# Patient Record
Sex: FEMALE | Race: ASIAN | NOT HISPANIC OR LATINO
[De-identification: names, ages, dates, MRNs, and addresses within clinical notes are randomized per-mention and may not be internally consistent; named-entity substitution may affect disease eponyms.]

---

## 2022-02-08 PROBLEM — Z00.00 ENCOUNTER FOR PREVENTIVE HEALTH EXAMINATION: Status: ACTIVE | Noted: 2022-02-08

## 2022-02-09 PROBLEM — R91.1 LUNG NODULE: Status: ACTIVE | Noted: 2022-02-09

## 2022-02-10 ENCOUNTER — APPOINTMENT (OUTPATIENT)
Dept: THORACIC SURGERY | Facility: CLINIC | Age: 72
End: 2022-02-10
Payer: MEDICARE

## 2022-02-10 VITALS
WEIGHT: 116 LBS | HEIGHT: 64 IN | SYSTOLIC BLOOD PRESSURE: 149 MMHG | HEART RATE: 76 BPM | OXYGEN SATURATION: 98 % | TEMPERATURE: 98 F | BODY MASS INDEX: 19.81 KG/M2 | DIASTOLIC BLOOD PRESSURE: 76 MMHG | RESPIRATION RATE: 18 BRPM

## 2022-02-10 DIAGNOSIS — R91.1 SOLITARY PULMONARY NODULE: ICD-10-CM

## 2022-02-10 DIAGNOSIS — Z86.79 PERSONAL HISTORY OF OTHER DISEASES OF THE CIRCULATORY SYSTEM: ICD-10-CM

## 2022-02-10 PROCEDURE — 99205 OFFICE O/P NEW HI 60 MIN: CPT

## 2022-02-11 PROBLEM — Z86.79 HISTORY OF HYPERTENSION: Status: RESOLVED | Noted: 2022-02-11 | Resolved: 2022-02-11

## 2022-02-11 RX ORDER — AMLODIPINE BESYLATE 5 MG/1
TABLET ORAL
Refills: 0 | Status: ACTIVE | COMMUNITY

## 2022-02-11 RX ORDER — PNV NO.95/FERROUS FUM/FOLIC AC 28MG-0.8MG
TABLET ORAL
Refills: 0 | Status: ACTIVE | COMMUNITY

## 2022-02-11 NOTE — PHYSICAL EXAM
[Restricted in physically strenuous activity but ambulatory and able to carry out work of a light or sedentary nature] : Status 1- Restricted in physically strenuous activity but ambulatory and able to carry out work of a light or sedentary nature, e.g., light house work, office work [General Appearance - Alert] : alert [General Appearance - In No Acute Distress] : in no acute distress [Sclera] : the sclera and conjunctiva were normal [PERRL With Normal Accommodation] : pupils were equal in size, round, and reactive to light [Extraocular Movements] : extraocular movements were intact [Outer Ear] : the ears and nose were normal in appearance [Oropharynx] : the oropharynx was normal [Neck Appearance] : the appearance of the neck was normal [Neck Cervical Mass (___cm)] : no neck mass was observed [Jugular Venous Distention Increased] : there was no jugular-venous distention [Thyroid Diffuse Enlargement] : the thyroid was not enlarged [Thyroid Nodule] : there were no palpable thyroid nodules [Auscultation Breath Sounds / Voice Sounds] : lungs were clear to auscultation bilaterally [Heart Rate And Rhythm] : heart rate was normal and rhythm regular [Heart Sounds] : normal S1 and S2 [Heart Sounds Gallop] : no gallops [Murmurs] : no murmurs [Heart Sounds Pericardial Friction Rub] : no pericardial rub [Examination Of The Chest] : the chest was normal in appearance [Chest Visual Inspection Thoracic Asymmetry] : no chest asymmetry [Diminished Respiratory Excursion] : normal chest expansion [2+] : left 2+ [Breast Appearance] : normal in appearance [Breast Palpation Mass] : no palpable masses [Bowel Sounds] : normal bowel sounds [Abdomen Soft] : soft [Abdomen Tenderness] : non-tender [Abdomen Mass (___ Cm)] : no abdominal mass palpated [External Female Genitalia] : normal external genitalia [Urethral Meatus] : normal urethra [Urinary Bladder Findings] : the bladder was normal on palpation [Cervical Lymph Nodes Enlarged Posterior Bilaterally] : posterior cervical [Cervical Lymph Nodes Enlarged Anterior Bilaterally] : anterior cervical [Supraclavicular Lymph Nodes Enlarged Bilaterally] : supraclavicular [Axillary Lymph Nodes Enlarged Bilaterally] : axillary [Femoral Lymph Nodes Enlarged Bilaterally] : femoral [Inguinal Lymph Nodes Enlarged Bilaterally] : inguinal [No CVA Tenderness] : no ~M costovertebral angle tenderness [No Spinal Tenderness] : no spinal tenderness [Abnormal Walk] : normal gait [Nail Clubbing] : no clubbing  or cyanosis of the fingernails [Musculoskeletal - Swelling] : no joint swelling seen [Motor Tone] : muscle strength and tone were normal [Skin Color & Pigmentation] : normal skin color and pigmentation [Skin Turgor] : normal skin turgor [] : no rash [Deep Tendon Reflexes (DTR)] : deep tendon reflexes were 2+ and symmetric [Sensation] : the sensory exam was normal to light touch and pinprick [No Focal Deficits] : no focal deficits [Oriented To Time, Place, And Person] : oriented to person, place, and time [Impaired Insight] : insight and judgment were intact [Affect] : the affect was normal

## 2022-02-14 NOTE — ASSESSMENT
[FreeTextEntry1] : Ms. TAMMIE OWEN, 71 year old female, never smoker, w/ hx of HTN, who presented with PCP for routine physical exam, CXR revealed abnormal result and prompted for a CT scan.  \par \par CT Chest on 1/27/22:\par - bronchial mucoid impaction with tree-in-bud nodularity posterior KEVIN (3:36-41)\par - ill-defined irregular and lobulated with spiculations nodular density 1.8 cm in RUL(3:44)\par - there is a tree-in-bud nodularity at periphery (3:42)\par \par I have reviewed the patient's medical records and diagnostic images at time of this office consultation and have made the following recommendation:\par 1. CT reviewed with pt. RUL nodule is very suspicious for malignancy, I recommended Rt VATS, robotic-assisted, RULobectomy, MLND on 2/28/22. Risks and benefits and alternatives explained to patient, all questions answered, patient agreed to proceed with surgery.\par 2. PFTs\par 3. PET/CT\par 4. Medical, cardiac clearance, COVID testing\par \par \par I personally performed the services described in the documentation, reviewed the documentation recorded by the scribe in my presence and it accurately and completely records my words and actions. \par \par I, Caprice Jara NP, am scribing for and the presence of Dr. Wilber Tirado the following sections, HISTORY OF PRESENT ILLNESS, PAST MEDICAL/FAMILY/SOCIAL HISTORY; REVIEW OF SYSTEMS; VITAL SIGNS; PHYSICAL EXAM; DISPOSITION.\par

## 2022-02-14 NOTE — CONSULT LETTER
[Dear  ___] : Dear  [unfilled], [Consult Letter:] : I had the pleasure of evaluating your patient, [unfilled]. [( Thank you for referring [unfilled] for consultation for _____ )] : Thank you for referring [unfilled] for consultation for [unfilled] [Please see my note below.] : Please see my note below. [Consult Closing:] : Thank you very much for allowing me to participate in the care of this patient.  If you have any questions, please do not hesitate to contact me. [Sincerely,] : Sincerely, [FreeTextEntry2] : Dr. Dunia Cespedes (PCP/ref) [FreeTextEntry3] : Wilber Tirado MD, MPH \par System Director of Thoracic Surgery \par Director of Comprehensive Lung and Fore[facility] [place] \par Professor Cardiovascular & Thoracic Surgery  \par [facility] [employer] at [facility]\par

## 2022-02-14 NOTE — HISTORY OF PRESENT ILLNESS
[FreeTextEntry1] : Ms. TAMMIE OWEN, 71 year old female, never smoker, w/ hx of HTN, who presented with PCP for routine physical exam, CXR revealed abnormal result and prompted for a CT scan.  \par \par CT Chest on 1/27/22:\par - bronchial mucoid impaction with tree-in-bud nodularity posterior KEVIN (3:36-41)\par - ill-defined irregular and lobulated with spiculations nodular density 1.8 cm (3:44)\par - there is a tree-in-bud nodularity at periphery (3:42)\par \par Pt presents today for CT Sx consultation, referred by Dr. Cespedes. Pt reports asymptomatic, denies fever, chills, SOB, cough, hemoptysis, CP, pain or recent illness.\par \par

## 2022-02-17 ENCOUNTER — OUTPATIENT (OUTPATIENT)
Dept: OUTPATIENT SERVICES | Facility: HOSPITAL | Age: 72
LOS: 1 days | End: 2022-02-17
Payer: MEDICARE

## 2022-02-17 VITALS
DIASTOLIC BLOOD PRESSURE: 80 MMHG | SYSTOLIC BLOOD PRESSURE: 144 MMHG | RESPIRATION RATE: 16 BRPM | HEART RATE: 64 BPM | OXYGEN SATURATION: 98 % | WEIGHT: 113.98 LBS | TEMPERATURE: 98 F | HEIGHT: 64 IN

## 2022-02-17 DIAGNOSIS — Z91.89 OTHER SPECIFIED PERSONAL RISK FACTORS, NOT ELSEWHERE CLASSIFIED: ICD-10-CM

## 2022-02-17 DIAGNOSIS — Z90.49 ACQUIRED ABSENCE OF OTHER SPECIFIED PARTS OF DIGESTIVE TRACT: Chronic | ICD-10-CM

## 2022-02-17 DIAGNOSIS — R91.1 SOLITARY PULMONARY NODULE: ICD-10-CM

## 2022-02-17 DIAGNOSIS — I10 ESSENTIAL (PRIMARY) HYPERTENSION: ICD-10-CM

## 2022-02-17 DIAGNOSIS — Z98.890 OTHER SPECIFIED POSTPROCEDURAL STATES: Chronic | ICD-10-CM

## 2022-02-17 LAB
ALBUMIN SERPL ELPH-MCNC: 4.6 G/DL — SIGNIFICANT CHANGE UP (ref 3.3–5)
ALP SERPL-CCNC: 60 U/L — SIGNIFICANT CHANGE UP (ref 40–120)
ALT FLD-CCNC: 12 U/L — SIGNIFICANT CHANGE UP (ref 4–33)
ANION GAP SERPL CALC-SCNC: 12 MMOL/L — SIGNIFICANT CHANGE UP (ref 7–14)
AST SERPL-CCNC: 18 U/L — SIGNIFICANT CHANGE UP (ref 4–32)
BILIRUB SERPL-MCNC: 0.3 MG/DL — SIGNIFICANT CHANGE UP (ref 0.2–1.2)
BLD GP AB SCN SERPL QL: NEGATIVE — SIGNIFICANT CHANGE UP
BUN SERPL-MCNC: 13 MG/DL — SIGNIFICANT CHANGE UP (ref 7–23)
CALCIUM SERPL-MCNC: 9.5 MG/DL — SIGNIFICANT CHANGE UP (ref 8.4–10.5)
CHLORIDE SERPL-SCNC: 105 MMOL/L — SIGNIFICANT CHANGE UP (ref 98–107)
CO2 SERPL-SCNC: 23 MMOL/L — SIGNIFICANT CHANGE UP (ref 22–31)
CREAT SERPL-MCNC: 0.56 MG/DL — SIGNIFICANT CHANGE UP (ref 0.5–1.3)
GLUCOSE SERPL-MCNC: 101 MG/DL — HIGH (ref 70–99)
HCT VFR BLD CALC: 42.1 % — SIGNIFICANT CHANGE UP (ref 34.5–45)
HGB BLD-MCNC: 13.7 G/DL — SIGNIFICANT CHANGE UP (ref 11.5–15.5)
MCHC RBC-ENTMCNC: 31.1 PG — SIGNIFICANT CHANGE UP (ref 27–34)
MCHC RBC-ENTMCNC: 32.5 GM/DL — SIGNIFICANT CHANGE UP (ref 32–36)
MCV RBC AUTO: 95.7 FL — SIGNIFICANT CHANGE UP (ref 80–100)
NRBC # BLD: 0 /100 WBCS — SIGNIFICANT CHANGE UP
NRBC # FLD: 0 K/UL — SIGNIFICANT CHANGE UP
PLATELET # BLD AUTO: 176 K/UL — SIGNIFICANT CHANGE UP (ref 150–400)
POTASSIUM SERPL-MCNC: 3.5 MMOL/L — SIGNIFICANT CHANGE UP (ref 3.5–5.3)
POTASSIUM SERPL-SCNC: 3.5 MMOL/L — SIGNIFICANT CHANGE UP (ref 3.5–5.3)
PROT SERPL-MCNC: 7.1 G/DL — SIGNIFICANT CHANGE UP (ref 6–8.3)
RBC # BLD: 4.4 M/UL — SIGNIFICANT CHANGE UP (ref 3.8–5.2)
RBC # FLD: 12.3 % — SIGNIFICANT CHANGE UP (ref 10.3–14.5)
RH IG SCN BLD-IMP: POSITIVE — SIGNIFICANT CHANGE UP
SODIUM SERPL-SCNC: 140 MMOL/L — SIGNIFICANT CHANGE UP (ref 135–145)
WBC # BLD: 4.8 K/UL — SIGNIFICANT CHANGE UP (ref 3.8–10.5)
WBC # FLD AUTO: 4.8 K/UL — SIGNIFICANT CHANGE UP (ref 3.8–10.5)

## 2022-02-17 PROCEDURE — 93010 ELECTROCARDIOGRAM REPORT: CPT

## 2022-02-17 RX ORDER — SODIUM CHLORIDE 9 MG/ML
1000 INJECTION, SOLUTION INTRAVENOUS
Refills: 0 | Status: DISCONTINUED | OUTPATIENT
Start: 2022-02-28 | End: 2022-03-02

## 2022-02-17 NOTE — H&P PST ADULT - EKG AND INTERPRETATION
Sissy 15, 2020      Anival Moore  [address] [facility] [id number]        Dear ,    We are writing to inform you of your test results.    Your lab results from your preoperative exam look great!  Your kidney function, electrolytes, and glucose level are normal.  Your complete blood count showed normal red and white blood cells, and platelets.     Please contact me if you have any further concerns or questions!     Resulted Orders   Basic metabolic panel  (Ca, Cl, CO2, Creat, Gluc, K, Na, BUN)   Result Value Ref Range    Sodium 139 133 - 144 mmol/L    Potassium 3.8 3.4 - 5.3 mmol/L    Chloride 103 94 - 109 mmol/L    Carbon Dioxide 31 20 - 32 mmol/L    Anion Gap 5 3 - 14 mmol/L    Glucose 86 70 - 99 mg/dL    Urea Nitrogen 15 7 - 30 mg/dL    Creatinine 0.95 0.66 - 1.25 mg/dL    GFR Estimate >90 >60 mL/min/[1.73_m2]      Comment:      Non  GFR Calc  Starting 12/18/2018, serum creatinine based estimated GFR (eGFR) will be   calculated using the Chronic Kidney Disease Epidemiology Collaboration   (CKD-EPI) equation.      GFR Estimate If Black >90 >60 mL/min/[1.73_m2]      Comment:       GFR Calc  Starting 12/18/2018, serum creatinine based estimated GFR (eGFR) will be   calculated using the Chronic Kidney Disease Epidemiology Collaboration   (CKD-EPI) equation.      Calcium 9.2 8.5 - 10.1 mg/dL   CBC with platelets   Result Value Ref Range    WBC 7.3 4.0 - 11.0 10e9/L    RBC Count 4.61 4.4 - 5.9 10e12/L    Hemoglobin 14.1 13.3 - 17.7 g/dL    Hematocrit 41.9 40.0 - 53.0 %    MCV 91 78 - 100 fl    MCH 30.6 26.5 - 33.0 pg    MCHC 33.7 31.5 - 36.5 g/dL    RDW 11.7 10.0 - 15.0 %    Platelet Count 290 150 - 450 10e9/L       If you have any questions or concerns, please call the clinic at the number listed above.       Sincerely,      Rafael Wilson DO          
EKG

## 2022-02-17 NOTE — H&P PST ADULT - HISTORY OF PRESENT ILLNESS
71 year old female with pre op dx of solitary pulmonary  71 year old Mandarin speaking   female with pre op dx of solitary pulmonary nodule is scheduled for robotic assisted right video assisted thoracoscopy right upper lobectomy, mediastinal lymph node dissection .

## 2022-02-17 NOTE — H&P PST ADULT - PROBLEM SELECTOR PLAN 3
Pt takes amlodipine at night. Advised to continue. Pt takes amlodipine at night. Advised to continue.  Cardiologist Clearance copy in chart.

## 2022-02-17 NOTE — H&P PST ADULT - LAST ECHOCARDIOGRAM
2021- WNL as per pt , done at cardiologist office. Unsure about cardiologist name 2021- WNL as per pt , done at cardiologist office

## 2022-02-17 NOTE — H&P PST ADULT - PROBLEM SELECTOR PLAN 1
Patient tentatively scheduled for robotic assisted right video assisted thoracoscopy right upper lobectomy, mediastinal lymph node dissection  on 02/28/2022.  Pre-op instructions provided. Pt given verbal and written instructions with teach back on chlorhexidine shampoo and pepcid. Pt verbalized understanding with return demonstration.   Pt strongly advised to follow up with surgeon to discuss COVID testing requirements prior to procedure.

## 2022-02-25 ENCOUNTER — LABORATORY RESULT (OUTPATIENT)
Age: 72
End: 2022-02-25

## 2022-02-25 NOTE — ASU PATIENT PROFILE, ADULT - FALL HARM RISK - UNIVERSAL INTERVENTIONS
Bed in lowest position, wheels locked, appropriate side rails in place/Call bell, personal items and telephone in reach/Instruct patient to call for assistance before getting out of bed or chair/Non-slip footwear when patient is out of bed/[place] to call system/Physically safe environment - no spills, clutter or unnecessary equipment/Purposeful Proactive Rounding/Room/bathroom lighting operational, light cord in reach

## 2022-02-26 RX ORDER — GABAPENTIN 400 MG/1
100 CAPSULE ORAL ONCE
Refills: 0 | Status: COMPLETED | OUTPATIENT
Start: 2022-02-28 | End: 2022-02-28

## 2022-02-26 RX ORDER — HEPARIN SODIUM 5000 [USP'U]/ML
5000 INJECTION INTRAVENOUS; SUBCUTANEOUS ONCE
Refills: 0 | Status: COMPLETED | OUTPATIENT
Start: 2022-02-28 | End: 2022-02-28

## 2022-02-28 ENCOUNTER — APPOINTMENT (OUTPATIENT)
Dept: THORACIC SURGERY | Facility: HOSPITAL | Age: 72
End: 2022-02-28

## 2022-02-28 ENCOUNTER — RESULT REVIEW (OUTPATIENT)
Age: 72
End: 2022-02-28

## 2022-02-28 ENCOUNTER — INPATIENT (INPATIENT)
Facility: HOSPITAL | Age: 72
LOS: 1 days | Discharge: HOME | End: 2022-03-02
Attending: THORACIC SURGERY (CARDIOTHORACIC VASCULAR SURGERY) | Admitting: THORACIC SURGERY (CARDIOTHORACIC VASCULAR SURGERY)
Payer: MEDICARE

## 2022-02-28 VITALS
HEIGHT: 64 IN | DIASTOLIC BLOOD PRESSURE: 88 MMHG | SYSTOLIC BLOOD PRESSURE: 132 MMHG | HEART RATE: 73 BPM | WEIGHT: 113.1 LBS | OXYGEN SATURATION: 100 % | TEMPERATURE: 99 F | RESPIRATION RATE: 16 BRPM

## 2022-02-28 DIAGNOSIS — R91.1 SOLITARY PULMONARY NODULE: ICD-10-CM

## 2022-02-28 DIAGNOSIS — Z90.49 ACQUIRED ABSENCE OF OTHER SPECIFIED PARTS OF DIGESTIVE TRACT: Chronic | ICD-10-CM

## 2022-02-28 DIAGNOSIS — Z98.890 OTHER SPECIFIED POSTPROCEDURAL STATES: Chronic | ICD-10-CM

## 2022-02-28 LAB
GRAM STN FLD: SIGNIFICANT CHANGE UP
SPECIMEN SOURCE: SIGNIFICANT CHANGE UP

## 2022-02-28 PROCEDURE — 88312 SPECIAL STAINS GROUP 1: CPT | Mod: 26

## 2022-02-28 PROCEDURE — S2900 ROBOTIC SURGICAL SYSTEM: CPT | Mod: NC

## 2022-02-28 PROCEDURE — 32666 THORACOSCOPY W/WEDGE RESECT: CPT

## 2022-02-28 PROCEDURE — 99232 SBSQ HOSP IP/OBS MODERATE 35: CPT | Mod: 57

## 2022-02-28 PROCEDURE — 71045 X-RAY EXAM CHEST 1 VIEW: CPT | Mod: 26

## 2022-02-28 PROCEDURE — 88331 PATH CONSLTJ SURG 1 BLK 1SPC: CPT | Mod: 26

## 2022-02-28 PROCEDURE — 88307 TISSUE EXAM BY PATHOLOGIST: CPT | Mod: 26

## 2022-02-28 PROCEDURE — 32666 THORACOSCOPY W/WEDGE RESECT: CPT | Mod: AS

## 2022-02-28 DEVICE — STAPLER COVIDIEN TRI-STAPLE 60MM PURPLE RELOAD: Type: IMPLANTABLE DEVICE | Site: RIGHT | Status: FUNCTIONAL

## 2022-02-28 DEVICE — STAPLER COVIDIEN TRI-STAPLE 60MM BLACK RELOAD: Type: IMPLANTABLE DEVICE | Site: RIGHT | Status: FUNCTIONAL

## 2022-02-28 DEVICE — CHEST DRAIN THORACIC ARGYLE PVC 24FR STRAIGHT: Type: IMPLANTABLE DEVICE | Site: RIGHT | Status: FUNCTIONAL

## 2022-02-28 DEVICE — SURGICEL 2 X 14": Type: IMPLANTABLE DEVICE | Site: RIGHT | Status: FUNCTIONAL

## 2022-02-28 RX ORDER — ERGOCALCIFEROL 1.25 MG/1
1 CAPSULE ORAL
Qty: 0 | Refills: 0 | DISCHARGE

## 2022-02-28 RX ORDER — ACETAMINOPHEN 500 MG
975 TABLET ORAL ONCE
Refills: 0 | Status: COMPLETED | OUTPATIENT
Start: 2022-02-28 | End: 2022-02-28

## 2022-02-28 RX ORDER — HYDROMORPHONE HYDROCHLORIDE 2 MG/ML
30 INJECTION INTRAMUSCULAR; INTRAVENOUS; SUBCUTANEOUS
Refills: 0 | Status: DISCONTINUED | OUTPATIENT
Start: 2022-02-28 | End: 2022-03-01

## 2022-02-28 RX ORDER — SENNA PLUS 8.6 MG/1
2 TABLET ORAL AT BEDTIME
Refills: 0 | Status: DISCONTINUED | OUTPATIENT
Start: 2022-02-28 | End: 2022-03-02

## 2022-02-28 RX ORDER — ONDANSETRON 8 MG/1
4 TABLET, FILM COATED ORAL EVERY 6 HOURS
Refills: 0 | Status: DISCONTINUED | OUTPATIENT
Start: 2022-02-28 | End: 2022-03-02

## 2022-02-28 RX ORDER — HEPARIN SODIUM 5000 [USP'U]/ML
5000 INJECTION INTRAVENOUS; SUBCUTANEOUS EVERY 12 HOURS
Refills: 0 | Status: DISCONTINUED | OUTPATIENT
Start: 2022-02-28 | End: 2022-03-02

## 2022-02-28 RX ORDER — NALOXONE HYDROCHLORIDE 4 MG/.1ML
0.1 SPRAY NASAL
Refills: 0 | Status: DISCONTINUED | OUTPATIENT
Start: 2022-02-28 | End: 2022-03-02

## 2022-02-28 RX ORDER — AMLODIPINE BESYLATE 2.5 MG/1
1 TABLET ORAL
Qty: 0 | Refills: 0 | DISCHARGE

## 2022-02-28 RX ORDER — ACETAMINOPHEN 500 MG
975 TABLET ORAL EVERY 6 HOURS
Refills: 0 | Status: DISCONTINUED | OUTPATIENT
Start: 2022-02-28 | End: 2022-03-02

## 2022-02-28 RX ORDER — DIPHENHYDRAMINE HCL 50 MG
25 CAPSULE ORAL EVERY 4 HOURS
Refills: 0 | Status: DISCONTINUED | OUTPATIENT
Start: 2022-02-28 | End: 2022-03-01

## 2022-02-28 RX ORDER — FAMOTIDINE 10 MG/ML
20 INJECTION INTRAVENOUS EVERY 12 HOURS
Refills: 0 | Status: DISCONTINUED | OUTPATIENT
Start: 2022-02-28 | End: 2022-03-02

## 2022-02-28 RX ORDER — SODIUM CHLORIDE 9 MG/ML
4 INJECTION INTRAMUSCULAR; INTRAVENOUS; SUBCUTANEOUS EVERY 8 HOURS
Refills: 0 | Status: DISCONTINUED | OUTPATIENT
Start: 2022-02-28 | End: 2022-03-02

## 2022-02-28 RX ORDER — HYDROMORPHONE HYDROCHLORIDE 2 MG/ML
0.5 INJECTION INTRAMUSCULAR; INTRAVENOUS; SUBCUTANEOUS
Refills: 0 | Status: DISCONTINUED | OUTPATIENT
Start: 2022-02-28 | End: 2022-03-01

## 2022-02-28 RX ADMIN — HYDROMORPHONE HYDROCHLORIDE 30 MILLILITER(S): 2 INJECTION INTRAMUSCULAR; INTRAVENOUS; SUBCUTANEOUS at 19:13

## 2022-02-28 RX ADMIN — HEPARIN SODIUM 5000 UNIT(S): 5000 INJECTION INTRAVENOUS; SUBCUTANEOUS at 14:29

## 2022-02-28 RX ADMIN — SODIUM CHLORIDE 30 MILLILITER(S): 9 INJECTION, SOLUTION INTRAVENOUS at 21:05

## 2022-02-28 RX ADMIN — HYDROMORPHONE HYDROCHLORIDE 0.5 MILLIGRAM(S): 2 INJECTION INTRAMUSCULAR; INTRAVENOUS; SUBCUTANEOUS at 19:28

## 2022-02-28 RX ADMIN — ONDANSETRON 4 MILLIGRAM(S): 8 TABLET, FILM COATED ORAL at 23:27

## 2022-02-28 RX ADMIN — SENNA PLUS 2 TABLET(S): 8.6 TABLET ORAL at 21:04

## 2022-02-28 RX ADMIN — GABAPENTIN 100 MILLIGRAM(S): 400 CAPSULE ORAL at 14:29

## 2022-02-28 RX ADMIN — Medication 975 MILLIGRAM(S): at 14:28

## 2022-02-28 NOTE — ASU PREOP CHECKLIST - BMI (KG/M2)
19.4 no loss of consciousness, no gait abnormality, no headache, no sensory deficits, and no weakness.

## 2022-02-28 NOTE — BRIEF OPERATIVE NOTE - NSICDXBRIEFPOSTOP_GEN_ALL_CORE_FT
POST-OP DIAGNOSIS:  Necrotizing granulomatous inflammation of lung 28-Feb-2022 18:16:11  Velasquez Hardy

## 2022-02-28 NOTE — BRIEF OPERATIVE NOTE - NSICDXBRIEFPROCEDURE_GEN_ALL_CORE_FT
PROCEDURES:  Wedge resection, lung, robot-assisted, using VATS 28-Feb-2022 18:15:42  Velasquez Hardy

## 2022-02-28 NOTE — BRIEF OPERATIVE NOTE - COMMENTS
I, Jose Bell PA-C provided direct first assist support to the surgeon during this surgical procedure. My involvement included positioning, prepping and draping the patient prior to surgery, robotic port placement, robotic docking, robotic instrument insertion and removal, ensuring clear visibility and exposure for the surgeon by using instruments such as retractors, suction and sponges, stapling tissues and vessels, retrieving specimens from the operative field, closing surgical incisions, undocking the robot and dressing wounds.  As well as other tasks as directed by the surgeon.

## 2022-02-28 NOTE — PROGRESS NOTE ADULT - SUBJECTIVE AND OBJECTIVE BOX
TAMMIE OWEN      71y   Female   MRN-[id number]         No Known Allergies             Daily Height in cm: 162.56 (28 Feb 2022 14:12)    Daily Drug Dosing Weight  Height (cm): 162.6 (28 Feb 2022 14:12)  Weight (kg): 51.3 (28 Feb 2022 14:12)  BMI (kg/m2): 19.4 (28 Feb 2022 14:12)  BSA (m2): 1.54 (28 Feb 2022 14:12)    HPI:  71 year old Mandarin speaking   female with pre op dx of solitary pulmonary nodule is scheduled for robotic assisted right video assisted thoracoscopy right upper lobectomy, mediastinal lymph node dissection . (17 Feb 2022 14:15)    Procedure:                       Issues:              Necrotizing granuloma  Lung nodule              Postop pain              Chest tube in place  HTN                Home Medications:  amLODIPine 2.5 mg oral tablet: 1 tab(s) orally once a day (28 Feb 2022 14:19)  vitamin D: 1 tab(s) orally once a day (28 Feb 2022 14:19)    PAST MEDICAL & SURGICAL HISTORY:  HTN (hypertension)    Solitary pulmonary nodule    H/O colonoscopy    History of appendectomy      Vital Signs Last 24 Hrs  T(C): 37 (28 Feb 2022 14:12), Max: 37 (28 Feb 2022 14:12)  T(F): 98.6 (28 Feb 2022 14:12), Max: 98.6 (28 Feb 2022 14:12)  HR: 73 (28 Feb 2022 14:12) (73 - 73)  BP: 132/88 (28 Feb 2022 14:12) (132/88 - 132/88)  BP(mean): --  RR: 16 (28 Feb 2022 14:12) (16 - 16)  SpO2: 100% (28 Feb 2022 14:12) (100% - 100%)  I&O's Detail    CAPILLARY BLOOD GLUCOSE        Home Medications:  amLODIPine 2.5 mg oral tablet: 1 tab(s) orally once a day (28 Feb 2022 14:19)  vitamin D: 1 tab(s) orally once a day (28 Feb 2022 14:19)    MEDICATIONS  (STANDING):  acetaminophen     Tablet .. 975 milliGRAM(s) Oral every 6 hours  famotidine    Tablet 20 milliGRAM(s) Oral every 12 hours  heparin   Injectable 5000 Unit(s) SubCutaneous every 12 hours  HYDROmorphone PCA (1 mG/mL) 30 milliLiter(s) PCA Continuous PCA Continuous  lactated ringers. 1000 milliLiter(s) (30 mL/Hr) IV Continuous <Continuous>  senna 2 Tablet(s) Oral at bedtime    MEDICATIONS  (PRN):  diphenhydrAMINE Injectable 25 milliGRAM(s) IV Push every 4 hours PRN Pruritus  HYDROmorphone PCA (1 mG/mL) Rescue Clinician Bolus 0.5 milliGRAM(s) IV Push every 15 minutes PRN for Pain Scale GREATER THAN 6  naloxone Injectable 0.1 milliGRAM(s) IV Push every 3 minutes PRN For ANY of the following changes in patient status:  A. RR LESS THAN 10 breaths per minute, B. Oxygen saturation LESS THAN 90%, C. Sedation score of 6  ondansetron Injectable 4 milliGRAM(s) IV Push every 6 hours PRN Nausea  sodium chloride 3%  Inhalation 4 milliLiter(s) Inhalation every 8 hours PRN For sputum induction        Physical exam:                             General:               Pt is awake, alert,  appears to be in pain but not in  distress                                                  Neuro:                  Nonfocal                             Cardiovascular:   S1 & S2, regular                           Respiratory:         Air entry is fair and equal on both sides, has bilateral conducted sounds                           GI:                          Soft, nondistended and nontender, Bowel sounds active                            Ext:                        No cyanosis or edema     Labs:                                                                 CXR:        Plan:  General: 71yFemale s/p  , experiencing  pain with deep breathing.                             Neuro:                                         Pain control with PCA /  Tylenol PRN                            Cardiovascular:                                          HTN: Continue hemodynamic monitoring and restart Norvasc                            Respiratory:                                         Pt is on 2L  nasal canula, wean off as tolerated                                          Comfortable, not in any distress.                                         Encourage incentive spirometry                                          Monitor chest tube output                                         Chest tube to suction                                                                  Continue bronchodilators, pulmonary toilet     Necrotizing granuloma  Respiratory isolation  Sputum for AFB x 3                            GI                                         On puree diet, advance to regular diet as tolerated                                         Continue Zofran / Reglan for nausea - PRN                                         Continue bowel regimen	                                                                 Renal:                                         Continue LR  30cc/hr                                         Monitor I/Os and electrolytes                                                                                        Hem/ Onc:                                         DVT prophylaxis with SQ Heparin and SCDs                                         Monitor chest tube output &  signs of bleeding.                                          Follow CBC in AM                           Infectious disease:                                            Monitor for fever / leukocytosis.                                          All surgical incision / chest tube  sites look clean                            Endocrine                                            Continue Accu-Checks with coverage       Pertinent clinical, laboratory, radiographic, hemodynamic, echocardiographic, respiratory data, microbiologic data and chart were reviewed and analyzed frequently throughout the course of the day and night  Patient seen, examined and plan discussed with CT Surgeon Dr. Tirado  / CTICU team during rounds.    OOB to chair and ambulate as tolerated.     Status discussed with patient /  patient's family and updated plan of care    I have spent 40 minutes with this patient including 20 minutes of time coordinating care, updating patient family.          Vishnu Wharton MD                                                                     TAMMIE OWEN      71y   Female   MRN-[id number]         No Known Allergies             Daily Height in cm: 162.56 (28 Feb 2022 14:12)    Daily Drug Dosing Weight  Height (cm): 162.6 (28 Feb 2022 14:12)  Weight (kg): 51.3 (28 Feb 2022 14:12)  BMI (kg/m2): 19.4 (28 Feb 2022 14:12)  BSA (m2): 1.54 (28 Feb 2022 14:12)    HPI:  71 year old Mandarin speaking   female with pre op dx of solitary pulmonary nodule is scheduled for robotic assisted right video assisted thoracoscopy right upper lobectomy, mediastinal lymph node dissection . (17 Feb 2022 14:15)    Procedure: FB, robot-assisted RVATS, wedge resection of RUL mass, Frozen: necrotizing granuloma. 2/28/2022                       Issues:              Necrotizing granuloma  Lung nodule              Postop pain              Chest tube in place  HTN                Home Medications:  amLODIPine 2.5 mg oral tablet: 1 tab(s) orally once a day (28 Feb 2022 14:19)  vitamin D: 1 tab(s) orally once a day (28 Feb 2022 14:19)    PAST MEDICAL & SURGICAL HISTORY:  HTN (hypertension)    Solitary pulmonary nodule    H/O colonoscopy    History of appendectomy      Vital Signs Last 24 Hrs  T(C): 37 (28 Feb 2022 14:12), Max: 37 (28 Feb 2022 14:12)  T(F): 98.6 (28 Feb 2022 14:12), Max: 98.6 (28 Feb 2022 14:12)  HR: 73 (28 Feb 2022 14:12) (73 - 73)  BP: 132/88 (28 Feb 2022 14:12) (132/88 - 132/88)  BP(mean): --  RR: 16 (28 Feb 2022 14:12) (16 - 16)  SpO2: 100% (28 Feb 2022 14:12) (100% - 100%)  I&O's Detail    CAPILLARY BLOOD GLUCOSE        Home Medications:  amLODIPine 2.5 mg oral tablet: 1 tab(s) orally once a day (28 Feb 2022 14:19)  vitamin D: 1 tab(s) orally once a day (28 Feb 2022 14:19)    MEDICATIONS  (STANDING):  acetaminophen     Tablet .. 975 milliGRAM(s) Oral every 6 hours  famotidine    Tablet 20 milliGRAM(s) Oral every 12 hours  heparin   Injectable 5000 Unit(s) SubCutaneous every 12 hours  HYDROmorphone PCA (1 mG/mL) 30 milliLiter(s) PCA Continuous PCA Continuous  lactated ringers. 1000 milliLiter(s) (30 mL/Hr) IV Continuous <Continuous>  senna 2 Tablet(s) Oral at bedtime    MEDICATIONS  (PRN):  diphenhydrAMINE Injectable 25 milliGRAM(s) IV Push every 4 hours PRN Pruritus  HYDROmorphone PCA (1 mG/mL) Rescue Clinician Bolus 0.5 milliGRAM(s) IV Push every 15 minutes PRN for Pain Scale GREATER THAN 6  naloxone Injectable 0.1 milliGRAM(s) IV Push every 3 minutes PRN For ANY of the following changes in patient status:  A. RR LESS THAN 10 breaths per minute, B. Oxygen saturation LESS THAN 90%, C. Sedation score of 6  ondansetron Injectable 4 milliGRAM(s) IV Push every 6 hours PRN Nausea  sodium chloride 3%  Inhalation 4 milliLiter(s) Inhalation every 8 hours PRN For sputum induction        Physical exam:                             General:               Pt is awake, alert,  appears to be in pain but not in  distress                                                  Neuro:                  Nonfocal                             Cardiovascular:   S1 & S2, regular                           Respiratory:         Air entry is fair and equal on both sides, has bilateral conducted sounds                           GI:                          Soft, nondistended and nontender, Bowel sounds active                            Ext:                        No cyanosis or edema     Labs:                                                                 CXR:  Postop changes, right chest tube in place      Plan:  General: 71yFemale s/p FB, robot-assisted RVATS, wedge resection of RUL mass, Frozen: necrotizing granuloma. 2/28/2022, experiencing  pain with deep breathing.                             Neuro:                                         Pain control with PCA /  Tylenol PRN                            Cardiovascular:                                          HTN: Continue hemodynamic monitoring and restart Norvasc                            Respiratory:                                         Pt is on 2L  nasal canula, wean off as tolerated                                          Comfortable, not in any distress.                                         Encourage incentive spirometry                                          Monitor chest tube output                                         Chest tube to suction                                                                  Continue bronchodilators, pulmonary toilet     Necrotizing granuloma  Respiratory isolation  Sputum for AFB x 3                            GI                                         On puree diet, advance to regular diet as tolerated                                         Continue Zofran / Reglan for nausea - PRN                                         Continue bowel regimen	                                                                 Renal:                                         Continue LR  30cc/hr                                         Monitor I/Os and electrolytes                                                                                        Hem/ Onc:                                         DVT prophylaxis with SQ Heparin and SCDs                                         Monitor chest tube output &  signs of bleeding.                                          Follow CBC in AM                           Infectious disease:                                            Monitor for fever / leukocytosis.                                          All surgical incision / chest tube  sites look clean                            Endocrine                                            Continue Accu-Checks with coverage       Pertinent clinical, laboratory, radiographic, hemodynamic, echocardiographic, respiratory data, microbiologic data and chart were reviewed and analyzed frequently throughout the course of the day and night  Patient seen, examined and plan discussed with CT Surgeon Dr. Tirado  / CTICU team during rounds.    OOB to chair and ambulate as tolerated.     Status discussed with patient /  patient's family and updated plan of care    I have spent 40 minutes with this patient including 20 minutes of time coordinating care, updating patient family.          Vishnu Wharton MD

## 2022-02-28 NOTE — PATIENT PROFILE ADULT - FALL HARM RISK - HARM RISK INTERVENTIONS

## 2022-02-28 NOTE — BRIEF OPERATIVE NOTE - OPERATION/FINDINGS
FB, robot-assisted RVATS, wedge resection of RUL mass  Frozen: necrotizing granuloma  25F R CT to sxn, no AL

## 2022-03-01 LAB
ANION GAP SERPL CALC-SCNC: 13 MMOL/L — SIGNIFICANT CHANGE UP (ref 7–14)
ANION GAP SERPL CALC-SCNC: 14 MMOL/L — SIGNIFICANT CHANGE UP (ref 7–14)
BASOPHILS # BLD AUTO: 0.01 K/UL — SIGNIFICANT CHANGE UP (ref 0–0.2)
BASOPHILS NFR BLD AUTO: 0.1 % — SIGNIFICANT CHANGE UP (ref 0–2)
BUN SERPL-MCNC: 13 MG/DL — SIGNIFICANT CHANGE UP (ref 7–23)
BUN SERPL-MCNC: 14 MG/DL — SIGNIFICANT CHANGE UP (ref 7–23)
CALCIUM SERPL-MCNC: 8.8 MG/DL — SIGNIFICANT CHANGE UP (ref 8.4–10.5)
CALCIUM SERPL-MCNC: 8.9 MG/DL — SIGNIFICANT CHANGE UP (ref 8.4–10.5)
CHLORIDE SERPL-SCNC: 104 MMOL/L — SIGNIFICANT CHANGE UP (ref 98–107)
CHLORIDE SERPL-SCNC: 104 MMOL/L — SIGNIFICANT CHANGE UP (ref 98–107)
CO2 SERPL-SCNC: 19 MMOL/L — LOW (ref 22–31)
CO2 SERPL-SCNC: 21 MMOL/L — LOW (ref 22–31)
CREAT SERPL-MCNC: 0.5 MG/DL — SIGNIFICANT CHANGE UP (ref 0.5–1.3)
CREAT SERPL-MCNC: 0.5 MG/DL — SIGNIFICANT CHANGE UP (ref 0.5–1.3)
EGFR: 101 ML/MIN/1.73M2 — SIGNIFICANT CHANGE UP
EGFR: 101 ML/MIN/1.73M2 — SIGNIFICANT CHANGE UP
EOSINOPHIL # BLD AUTO: 0 K/UL — SIGNIFICANT CHANGE UP (ref 0–0.5)
EOSINOPHIL NFR BLD AUTO: 0 % — SIGNIFICANT CHANGE UP (ref 0–6)
GLUCOSE SERPL-MCNC: 149 MG/DL — HIGH (ref 70–99)
GLUCOSE SERPL-MCNC: 151 MG/DL — HIGH (ref 70–99)
HCT VFR BLD CALC: 41.6 % — SIGNIFICANT CHANGE UP (ref 34.5–45)
HCT VFR BLD CALC: 41.9 % — SIGNIFICANT CHANGE UP (ref 34.5–45)
HGB BLD-MCNC: 13.8 G/DL — SIGNIFICANT CHANGE UP (ref 11.5–15.5)
HGB BLD-MCNC: 14 G/DL — SIGNIFICANT CHANGE UP (ref 11.5–15.5)
IANC: 8.21 K/UL — SIGNIFICANT CHANGE UP (ref 1.5–8.5)
IMM GRANULOCYTES NFR BLD AUTO: 0.4 % — SIGNIFICANT CHANGE UP (ref 0–1.5)
LYMPHOCYTES # BLD AUTO: 0.47 K/UL — LOW (ref 1–3.3)
LYMPHOCYTES # BLD AUTO: 5.3 % — LOW (ref 13–44)
MAGNESIUM SERPL-MCNC: 2 MG/DL — SIGNIFICANT CHANGE UP (ref 1.6–2.6)
MCHC RBC-ENTMCNC: 31.5 PG — SIGNIFICANT CHANGE UP (ref 27–34)
MCHC RBC-ENTMCNC: 31.8 PG — SIGNIFICANT CHANGE UP (ref 27–34)
MCHC RBC-ENTMCNC: 33.2 GM/DL — SIGNIFICANT CHANGE UP (ref 32–36)
MCHC RBC-ENTMCNC: 33.4 GM/DL — SIGNIFICANT CHANGE UP (ref 32–36)
MCV RBC AUTO: 95 FL — SIGNIFICANT CHANGE UP (ref 80–100)
MCV RBC AUTO: 95.2 FL — SIGNIFICANT CHANGE UP (ref 80–100)
MONOCYTES # BLD AUTO: 0.17 K/UL — SIGNIFICANT CHANGE UP (ref 0–0.9)
MONOCYTES NFR BLD AUTO: 1.9 % — LOW (ref 2–14)
NEUTROPHILS # BLD AUTO: 8.21 K/UL — HIGH (ref 1.8–7.4)
NEUTROPHILS NFR BLD AUTO: 92.3 % — HIGH (ref 43–77)
NIGHT BLUE STAIN TISS: SIGNIFICANT CHANGE UP
NIGHT BLUE STAIN TISS: SIGNIFICANT CHANGE UP
NRBC # BLD: 0 /100 WBCS — SIGNIFICANT CHANGE UP
NRBC # BLD: 0 /100 WBCS — SIGNIFICANT CHANGE UP
NRBC # FLD: 0 K/UL — SIGNIFICANT CHANGE UP
NRBC # FLD: 0 K/UL — SIGNIFICANT CHANGE UP
PHOSPHATE SERPL-MCNC: 3.3 MG/DL — SIGNIFICANT CHANGE UP (ref 2.5–4.5)
PLATELET # BLD AUTO: 153 K/UL — SIGNIFICANT CHANGE UP (ref 150–400)
PLATELET # BLD AUTO: 153 K/UL — SIGNIFICANT CHANGE UP (ref 150–400)
POTASSIUM SERPL-MCNC: 4.1 MMOL/L — SIGNIFICANT CHANGE UP (ref 3.5–5.3)
POTASSIUM SERPL-MCNC: 4.2 MMOL/L — SIGNIFICANT CHANGE UP (ref 3.5–5.3)
POTASSIUM SERPL-SCNC: 4.1 MMOL/L — SIGNIFICANT CHANGE UP (ref 3.5–5.3)
POTASSIUM SERPL-SCNC: 4.2 MMOL/L — SIGNIFICANT CHANGE UP (ref 3.5–5.3)
RBC # BLD: 4.38 M/UL — SIGNIFICANT CHANGE UP (ref 3.8–5.2)
RBC # BLD: 4.4 M/UL — SIGNIFICANT CHANGE UP (ref 3.8–5.2)
RBC # FLD: 12.2 % — SIGNIFICANT CHANGE UP (ref 10.3–14.5)
RBC # FLD: 12.2 % — SIGNIFICANT CHANGE UP (ref 10.3–14.5)
SODIUM SERPL-SCNC: 137 MMOL/L — SIGNIFICANT CHANGE UP (ref 135–145)
SODIUM SERPL-SCNC: 138 MMOL/L — SIGNIFICANT CHANGE UP (ref 135–145)
SPECIMEN SOURCE: SIGNIFICANT CHANGE UP
SPECIMEN SOURCE: SIGNIFICANT CHANGE UP
WBC # BLD: 8.83 K/UL — SIGNIFICANT CHANGE UP (ref 3.8–10.5)
WBC # BLD: 8.9 K/UL — SIGNIFICANT CHANGE UP (ref 3.8–10.5)
WBC # FLD AUTO: 8.83 K/UL — SIGNIFICANT CHANGE UP (ref 3.8–10.5)
WBC # FLD AUTO: 8.9 K/UL — SIGNIFICANT CHANGE UP (ref 3.8–10.5)

## 2022-03-01 PROCEDURE — 71045 X-RAY EXAM CHEST 1 VIEW: CPT | Mod: 26

## 2022-03-01 PROCEDURE — 99233 SBSQ HOSP IP/OBS HIGH 50: CPT

## 2022-03-01 RX ORDER — OXYCODONE HYDROCHLORIDE 5 MG/1
5 TABLET ORAL
Refills: 0 | Status: DISCONTINUED | OUTPATIENT
Start: 2022-03-01 | End: 2022-03-02

## 2022-03-01 RX ORDER — SODIUM CHLORIDE 9 MG/ML
250 INJECTION, SOLUTION INTRAVENOUS ONCE
Refills: 0 | Status: COMPLETED | OUTPATIENT
Start: 2022-03-01 | End: 2022-03-01

## 2022-03-01 RX ORDER — SODIUM CHLORIDE 9 MG/ML
250 INJECTION, SOLUTION INTRAVENOUS ONCE
Refills: 0 | Status: DISCONTINUED | OUTPATIENT
Start: 2022-03-01 | End: 2022-03-02

## 2022-03-01 RX ORDER — TAMSULOSIN HYDROCHLORIDE 0.4 MG/1
0.4 CAPSULE ORAL AT BEDTIME
Refills: 0 | Status: DISCONTINUED | OUTPATIENT
Start: 2022-03-01 | End: 2022-03-02

## 2022-03-01 RX ORDER — LIDOCAINE 4 G/100G
1 CREAM TOPICAL EVERY 24 HOURS
Refills: 0 | Status: DISCONTINUED | OUTPATIENT
Start: 2022-03-01 | End: 2022-03-02

## 2022-03-01 RX ORDER — LIDOCAINE 4 G/100G
1 CREAM TOPICAL ONCE
Refills: 0 | Status: COMPLETED | OUTPATIENT
Start: 2022-03-01 | End: 2022-03-01

## 2022-03-01 RX ADMIN — Medication 975 MILLIGRAM(S): at 23:23

## 2022-03-01 RX ADMIN — SENNA PLUS 2 TABLET(S): 8.6 TABLET ORAL at 21:29

## 2022-03-01 RX ADMIN — TAMSULOSIN HYDROCHLORIDE 0.4 MILLIGRAM(S): 0.4 CAPSULE ORAL at 10:48

## 2022-03-01 RX ADMIN — OXYCODONE HYDROCHLORIDE 5 MILLIGRAM(S): 5 TABLET ORAL at 22:00

## 2022-03-01 RX ADMIN — OXYCODONE HYDROCHLORIDE 5 MILLIGRAM(S): 5 TABLET ORAL at 14:00

## 2022-03-01 RX ADMIN — SODIUM CHLORIDE 1000 MILLILITER(S): 9 INJECTION, SOLUTION INTRAVENOUS at 11:01

## 2022-03-01 RX ADMIN — OXYCODONE HYDROCHLORIDE 5 MILLIGRAM(S): 5 TABLET ORAL at 20:35

## 2022-03-01 RX ADMIN — LIDOCAINE 1 PATCH: 4 CREAM TOPICAL at 07:55

## 2022-03-01 RX ADMIN — LIDOCAINE 1 PATCH: 4 CREAM TOPICAL at 06:11

## 2022-03-01 RX ADMIN — Medication 975 MILLIGRAM(S): at 01:00

## 2022-03-01 RX ADMIN — HYDROMORPHONE HYDROCHLORIDE 30 MILLILITER(S): 2 INJECTION INTRAMUSCULAR; INTRAVENOUS; SUBCUTANEOUS at 07:08

## 2022-03-01 RX ADMIN — LIDOCAINE 1 PATCH: 4 CREAM TOPICAL at 23:22

## 2022-03-01 RX ADMIN — FAMOTIDINE 20 MILLIGRAM(S): 10 INJECTION INTRAVENOUS at 17:05

## 2022-03-01 RX ADMIN — Medication 975 MILLIGRAM(S): at 02:00

## 2022-03-01 RX ADMIN — SODIUM CHLORIDE 4 MILLILITER(S): 9 INJECTION INTRAMUSCULAR; INTRAVENOUS; SUBCUTANEOUS at 13:59

## 2022-03-01 RX ADMIN — HEPARIN SODIUM 5000 UNIT(S): 5000 INJECTION INTRAVENOUS; SUBCUTANEOUS at 05:02

## 2022-03-01 RX ADMIN — TAMSULOSIN HYDROCHLORIDE 0.4 MILLIGRAM(S): 0.4 CAPSULE ORAL at 21:29

## 2022-03-01 RX ADMIN — ONDANSETRON 4 MILLIGRAM(S): 8 TABLET, FILM COATED ORAL at 08:28

## 2022-03-01 RX ADMIN — HEPARIN SODIUM 5000 UNIT(S): 5000 INJECTION INTRAVENOUS; SUBCUTANEOUS at 17:05

## 2022-03-01 RX ADMIN — FAMOTIDINE 20 MILLIGRAM(S): 10 INJECTION INTRAVENOUS at 05:02

## 2022-03-01 RX ADMIN — LIDOCAINE 1 PATCH: 4 CREAM TOPICAL at 20:27

## 2022-03-01 NOTE — PROGRESS NOTE ADULT - SUBJECTIVE AND OBJECTIVE BOX
TAMMIE OWEN                     [id number]    HPI:  71 year old Mandarin speaking   female with pre op dx of solitary pulmonary nodule is scheduled for robotic assisted right video assisted thoracoscopy right upper lobectomy, mediastinal lymph node dissection . (17 Feb 2022 14:15)    Procedure: FB, robot-assisted RVATS, wedge resection of RUL mass, Frozen: necrotizing granuloma. 2/28/2022                       Issues:              Necrotizing granuloma  Lung nodule              Postop pain              Chest tube in place  HTN    PAST MEDICAL & SURGICAL HISTORY:  HTN (hypertension)    Solitary pulmonary nodule    H/O colonoscopy    History of appendectomy              VITAL SIGNS:  Vital Signs Last 24 Hrs  T(C): 36.6 (01 Mar 2022 04:00), Max: 37 (28 Feb 2022 14:12)  T(F): 97.9 (01 Mar 2022 04:00), Max: 98.6 (28 Feb 2022 14:12)  HR: 62 (01 Mar 2022 08:00) (60 - 88)  BP: 121/55 (01 Mar 2022 08:00) (108/52 - 149/75)  BP(mean): 73 (01 Mar 2022 08:00) (66 - 94)  RR: 11 (01 Mar 2022 08:00) (11 - 21)  SpO2: 96% (01 Mar 2022 08:00) (95% - 100%)    I/Os:   I&O's Detail    28 Feb 2022 07:01  -  01 Mar 2022 07:00  --------------------------------------------------------  IN:    Lactated Ringers: 420 mL  Total IN: 420 mL    OUT:    Chest Tube (mL): 5 mL  Total OUT: 5 mL    Total NET: 415 mL          CAPILLARY BLOOD GLUCOSE          =======================MEDICATIONS===================  MEDICATIONS  (STANDING):  acetaminophen     Tablet .. 975 milliGRAM(s) Oral every 6 hours  famotidine    Tablet 20 milliGRAM(s) Oral every 12 hours  heparin   Injectable 5000 Unit(s) SubCutaneous every 12 hours  HYDROmorphone PCA (1 mG/mL) 30 milliLiter(s) PCA Continuous PCA Continuous  lactated ringers. 1000 milliLiter(s) (30 mL/Hr) IV Continuous <Continuous>  senna 2 Tablet(s) Oral at bedtime    MEDICATIONS  (PRN):  diphenhydrAMINE Injectable 25 milliGRAM(s) IV Push every 4 hours PRN Pruritus  HYDROmorphone PCA (1 mG/mL) Rescue Clinician Bolus 0.5 milliGRAM(s) IV Push every 15 minutes PRN for Pain Scale GREATER THAN 6  naloxone Injectable 0.1 milliGRAM(s) IV Push every 3 minutes PRN For ANY of the following changes in patient status:  A. RR LESS THAN 10 breaths per minute, B. Oxygen saturation LESS THAN 90%, C. Sedation score of 6  ondansetron Injectable 4 milliGRAM(s) IV Push every 6 hours PRN Nausea  sodium chloride 3%  Inhalation 4 milliLiter(s) Inhalation every 8 hours PRN For sputum induction    PHYSICAL EXAM============================  General:                         Awake, alert, not in any distress  Neuro:                            Moving all extremities to commands.   Respiratory:	Air entry fair and  bilateral conducted sounds                                           Effort even and unlabored.  CV:		Regular rate and rhythm. Normal S1/S2                                          Distal pulses present.  Abdomen:	                     Soft, non-distended. Bowel sounds present   Skin:		No rash.  Extremities:	Warm, no cyanosis or edema.  Palpable pulses    ============================LABS=========================                        14.0   8.90  )-----------( 153      ( 01 Mar 2022 04:49 )             41.9     03-01    137  |  104  |  14  ----------------------------<  149<H>  4.2   |  19<L>  |  0.50    Ca    8.8      01 Mar 2022 04:49  Phos  3.3     03-01  Mg     2.00     03-01    A/P:  71yFemale s/p FB, robot-assisted RVATS, wedge resection of RUL mass, Frozen: necrotizing granuloma. 2/28/2022, experiencing  pain with deep breathing.                             Neuro:   Nonfocal                                         Pain control with PCA /  Tylenol PRN                            Cardiovascular:                                          HTN: Continue hemodynamic monitoring and restart Norvasc                            Respiratory:                                         Pt is on 2L  nasal canula, wean off as tolerated                                          Comfortable, not in any distress.                                         Encourage incentive spirometry                                          Monitor chest tube output                                         Chest tube to suction                                                                  Continue bronchodilators, pulmonary toilet     Necrotizing granuloma  Respiratory isolation  Sputum for AFB x 3                            GI                                         On puree diet, advance to regular diet as tolerated                                         Continue Zofran / Reglan for nausea - PRN                                         Continue bowel regimen	                                                                 Renal:                                         Continue LR  30cc/hr                                         Monitor I/Os and electrolytes                                                                                        Hem/ Onc:                                         DVT prophylaxis with SQ Heparin and SCDs                                         Monitor chest tube output &  signs of bleeding.                                          Follow CBC in AM                           Infectious disease:                                            Monitor for fever / leukocytosis.                                          All surgical incision / chest tube  sites look clean                            Endocrine                                            Continue Accu-Checks with coverage       Pertinent clinical, laboratory, radiographic, hemodynamic, echocardiographic, respiratory data, microbiologic data and chart were reviewed and analyzed frequently throughout the course of the day and night  Patient seen, examined and plan discussed with CT Surgeon Dr. Tirado  / CTICU team during rounds.    OOB to chair and ambulate as tolerated.     Status discussed with patient /  patient's family and updated plan of care    I have spent 35 minutes with this patient including 20 minutes of time coordinating care, updating patient family.      Arron Duffy DO, FACEP

## 2022-03-01 NOTE — PROGRESS NOTE ADULT - SUBJECTIVE AND OBJECTIVE BOX
ANESTHESIA POSTOP CHECK    71y Female POSTOP DAY 1 S/P RVATS/Lobectomy    Vital Signs Last 24 Hrs  T(C): 36.7 (01 Mar 2022 10:00), Max: 37 (28 Feb 2022 14:12)  T(F): 98 (01 Mar 2022 10:00), Max: 98.6 (28 Feb 2022 14:12)  HR: 58 (01 Mar 2022 10:00) (58 - 88)  BP: 133/59 (01 Mar 2022 10:00) (108/52 - 149/75)  BP(mean): 79 (01 Mar 2022 10:00) (66 - 94)  RR: 15 (01 Mar 2022 10:00) (11 - 21)  SpO2: 98% (01 Mar 2022 10:00) (95% - 100%)  I&O's Summary    28 Feb 2022 07:01  -  01 Mar 2022 07:00  --------------------------------------------------------  IN: 420 mL / OUT: 5 mL / NET: 415 mL        [X ] NO APPARENT ANESTHESIA COMPLICATIONS      Comments:

## 2022-03-01 NOTE — PROGRESS NOTE ADULT - SUBJECTIVE AND OBJECTIVE BOX
Anesthesia Pain Management Service    SUBJECTIVE: Patient reports pain is well controlled but is having significant N/V.    Pain Scale Score	At rest: __5_ 	With Activity: ___ 	[X ] Refer to charted pain scores    THERAPY:    [ ] IV PCA Morphine		[ ] 5 mg/mL	[ ] 1 mg/mL  [X ] IV PCA Hydromorphone	[ ] 5 mg/mL	[X ] 1 mg/mL  [ ] IV PCA Fentanyl		[ ] 50 micrograms/mL    Demand dose __0.2_ lockout __6_ (minutes) Continuous Rate _0__ Total: _2.3__   mg used (in past 24 hrs)      MEDICATIONS  (STANDING):  acetaminophen     Tablet .. 975 milliGRAM(s) Oral every 6 hours  famotidine    Tablet 20 milliGRAM(s) Oral every 12 hours  heparin   Injectable 5000 Unit(s) SubCutaneous every 12 hours  lactated ringers. 1000 milliLiter(s) (30 mL/Hr) IV Continuous <Continuous>  lidocaine   4% Patch 1 Patch Transdermal every 24 hours  senna 2 Tablet(s) Oral at bedtime  tamsulosin 0.4 milliGRAM(s) Oral at bedtime    MEDICATIONS  (PRN):  naloxone Injectable 0.1 milliGRAM(s) IV Push every 3 minutes PRN For ANY of the following changes in patient status:  A. RR LESS THAN 10 breaths per minute, B. Oxygen saturation LESS THAN 90%, C. Sedation score of 6  ondansetron Injectable 4 milliGRAM(s) IV Push every 6 hours PRN Nausea  oxyCODONE    IR 5 milliGRAM(s) Oral every 3 hours PRN Moderate & Severe Pain (4- 10)  sodium chloride 3%  Inhalation 4 milliLiter(s) Inhalation every 8 hours PRN For sputum induction      OBJECTIVE: Pt sitting in chair, vomiting into pink basin multiple times.    Sedation Score:	[ X] Alert	[ ] Drowsy 	[ ] Arousable	[ ] Asleep	[ ] Unresponsive    Side Effects:	[ ] None	[x ] Nausea	[x ] Vomiting	[ ] Pruritus  		[ ] Other:    Vital Signs Last 24 Hrs  T(C): 36.7 (01 Mar 2022 10:00), Max: 37 (28 Feb 2022 14:12)  T(F): 98 (01 Mar 2022 10:00), Max: 98.6 (28 Feb 2022 14:12)  HR: 61 (01 Mar 2022 11:00) (58 - 88)  BP: 123/56 (01 Mar 2022 11:00) (108/52 - 149/75)  BP(mean): 73 (01 Mar 2022 11:00) (66 - 94)  RR: 14 (01 Mar 2022 11:00) (11 - 21)  SpO2: 96% (01 Mar 2022 11:00) (95% - 100%)    ASSESSMENT/ PLAN    Therapy to  be:	[ ] Continue   [ X] Discontinued   [X ] Change to prn Analgesics    Documentation and Verification of current medications:   [X] Done	[ ] Not done, not elligible    Comments: PRN Oral/IV opioids and/or Adjuvant non-opioid medication to be ordered at this point.    Progress Note written now but Patient was seen earlier.

## 2022-03-01 NOTE — PROGRESS NOTE ADULT - SUBJECTIVE AND OBJECTIVE BOX
Anesthesia Pain Management Service- Attending Addendum    SUBJECTIVE: Patient's pain control adequate    Therapy:	  [ X] IV PCA	   [ ] Epidural           [ ] s/p Spinal Opoid              [ ] Postpartum infusion	  [ ] Patient controlled regional anesthesia (PCRA)    [ ] prn Analgesics    Allergies    No Known Allergies    Intolerances      MEDICATIONS  (STANDING):  acetaminophen     Tablet .. 975 milliGRAM(s) Oral every 6 hours  famotidine    Tablet 20 milliGRAM(s) Oral every 12 hours  heparin   Injectable 5000 Unit(s) SubCutaneous every 12 hours  lactated ringers Bolus 250 milliLiter(s) IV Bolus once  lactated ringers. 1000 milliLiter(s) (30 mL/Hr) IV Continuous <Continuous>  lidocaine   4% Patch 1 Patch Transdermal every 24 hours  senna 2 Tablet(s) Oral at bedtime  tamsulosin 0.4 milliGRAM(s) Oral at bedtime    MEDICATIONS  (PRN):  naloxone Injectable 0.1 milliGRAM(s) IV Push every 3 minutes PRN For ANY of the following changes in patient status:  A. RR LESS THAN 10 breaths per minute, B. Oxygen saturation LESS THAN 90%, C. Sedation score of 6  ondansetron Injectable 4 milliGRAM(s) IV Push every 6 hours PRN Nausea  oxyCODONE    IR 5 milliGRAM(s) Oral every 3 hours PRN Moderate & Severe Pain (4- 10)  sodium chloride 3%  Inhalation 4 milliLiter(s) Inhalation every 8 hours PRN For sputum induction      OBJECTIVE:   [X] No new signs     [ ] Other:    Side Effects:  [X ] None			[ ] Other:      ASSESSMENT/PLAN  -Discontinue current therapy    [ ] Therapy changed to:    [ ] IV PCA       [ ] Epidural     [ X] prn Analgesics     Comments: Pain management per primary team, APS to sign off

## 2022-03-02 ENCOUNTER — TRANSCRIPTION ENCOUNTER (OUTPATIENT)
Age: 72
End: 2022-03-02

## 2022-03-02 VITALS
DIASTOLIC BLOOD PRESSURE: 63 MMHG | RESPIRATION RATE: 20 BRPM | OXYGEN SATURATION: 96 % | HEART RATE: 65 BPM | SYSTOLIC BLOOD PRESSURE: 117 MMHG

## 2022-03-02 LAB
ANION GAP SERPL CALC-SCNC: 10 MMOL/L — SIGNIFICANT CHANGE UP (ref 7–14)
BUN SERPL-MCNC: 13 MG/DL — SIGNIFICANT CHANGE UP (ref 7–23)
CALCIUM SERPL-MCNC: 8.6 MG/DL — SIGNIFICANT CHANGE UP (ref 8.4–10.5)
CHLORIDE SERPL-SCNC: 105 MMOL/L — SIGNIFICANT CHANGE UP (ref 98–107)
CO2 SERPL-SCNC: 23 MMOL/L — SIGNIFICANT CHANGE UP (ref 22–31)
CREAT SERPL-MCNC: 0.58 MG/DL — SIGNIFICANT CHANGE UP (ref 0.5–1.3)
EGFR: 97 ML/MIN/1.73M2 — SIGNIFICANT CHANGE UP
GLUCOSE SERPL-MCNC: 118 MG/DL — HIGH (ref 70–99)
HCT VFR BLD CALC: 38.5 % — SIGNIFICANT CHANGE UP (ref 34.5–45)
HGB BLD-MCNC: 12.7 G/DL — SIGNIFICANT CHANGE UP (ref 11.5–15.5)
MCHC RBC-ENTMCNC: 31.6 PG — SIGNIFICANT CHANGE UP (ref 27–34)
MCHC RBC-ENTMCNC: 33 GM/DL — SIGNIFICANT CHANGE UP (ref 32–36)
MCV RBC AUTO: 95.8 FL — SIGNIFICANT CHANGE UP (ref 80–100)
NIGHT BLUE STAIN TISS: SIGNIFICANT CHANGE UP
NIGHT BLUE STAIN TISS: SIGNIFICANT CHANGE UP
NRBC # BLD: 0 /100 WBCS — SIGNIFICANT CHANGE UP
NRBC # FLD: 0 K/UL — SIGNIFICANT CHANGE UP
PLATELET # BLD AUTO: 145 K/UL — LOW (ref 150–400)
POTASSIUM SERPL-MCNC: 4.1 MMOL/L — SIGNIFICANT CHANGE UP (ref 3.5–5.3)
POTASSIUM SERPL-SCNC: 4.1 MMOL/L — SIGNIFICANT CHANGE UP (ref 3.5–5.3)
RBC # BLD: 4.02 M/UL — SIGNIFICANT CHANGE UP (ref 3.8–5.2)
RBC # FLD: 12.5 % — SIGNIFICANT CHANGE UP (ref 10.3–14.5)
SODIUM SERPL-SCNC: 138 MMOL/L — SIGNIFICANT CHANGE UP (ref 135–145)
SPECIMEN SOURCE: SIGNIFICANT CHANGE UP
SPECIMEN SOURCE: SIGNIFICANT CHANGE UP
WBC # BLD: 8.33 K/UL — SIGNIFICANT CHANGE UP (ref 3.8–10.5)
WBC # FLD AUTO: 8.33 K/UL — SIGNIFICANT CHANGE UP (ref 3.8–10.5)

## 2022-03-02 PROCEDURE — 71045 X-RAY EXAM CHEST 1 VIEW: CPT | Mod: 26,77

## 2022-03-02 PROCEDURE — 99232 SBSQ HOSP IP/OBS MODERATE 35: CPT

## 2022-03-02 PROCEDURE — 71045 X-RAY EXAM CHEST 1 VIEW: CPT | Mod: 26,76

## 2022-03-02 RX ORDER — OXYCODONE HYDROCHLORIDE 5 MG/1
1 TABLET ORAL
Qty: 16 | Refills: 0
Start: 2022-03-02 | End: 2022-03-05

## 2022-03-02 RX ORDER — DOCUSATE SODIUM 100 MG
1 CAPSULE ORAL
Qty: 21 | Refills: 0
Start: 2022-03-02 | End: 2022-03-08

## 2022-03-02 RX ORDER — ACETAMINOPHEN 500 MG
3 TABLET ORAL
Qty: 36 | Refills: 0
Start: 2022-03-02 | End: 2022-03-05

## 2022-03-02 RX ADMIN — HEPARIN SODIUM 5000 UNIT(S): 5000 INJECTION INTRAVENOUS; SUBCUTANEOUS at 05:00

## 2022-03-02 RX ADMIN — Medication 975 MILLIGRAM(S): at 12:00

## 2022-03-02 RX ADMIN — LIDOCAINE 1 PATCH: 4 CREAM TOPICAL at 10:44

## 2022-03-02 RX ADMIN — Medication 975 MILLIGRAM(S): at 05:00

## 2022-03-02 RX ADMIN — LIDOCAINE 1 PATCH: 4 CREAM TOPICAL at 10:45

## 2022-03-02 RX ADMIN — FAMOTIDINE 20 MILLIGRAM(S): 10 INJECTION INTRAVENOUS at 05:00

## 2022-03-02 RX ADMIN — Medication 975 MILLIGRAM(S): at 00:00

## 2022-03-02 RX ADMIN — Medication 975 MILLIGRAM(S): at 12:30

## 2022-03-02 RX ADMIN — Medication 975 MILLIGRAM(S): at 05:30

## 2022-03-02 NOTE — DISCHARGE NOTE PROVIDER - CARE PROVIDER_API CALL
Wilber Tirado (MD)  Surgery; Thoracic Surgery  [phone] [place] [address]  Phone: [phone]  Fax: [phone]  Follow Up Time:

## 2022-03-02 NOTE — DISCHARGE NOTE PROVIDER - HOSPITAL COURSE
71 year old female, pmhx HTN, s/p FB Robotic Assisted RUL wedge resection on 2/28/2022. Prelim +AFB. 3 Sputum AFBs were sent and all were negative. Chest tube was removed on 3/2, stable pneumothorax. Patient in no respiratory distress. She is for discharge home. 71 year old female, pmhx HTN, s/p FB Robotic Assisted RUL wedge resection on 2/28/2022. Prelim in [place] necrotizing granuloma. 3 Sputum AFBs were sent and all were resulted negative. Chest tube was removed on 3/2, stable pneumothorax. Patient in no respiratory distress. She is for discharge home.

## 2022-03-02 NOTE — DISCHARGE NOTE NURSING/CASE MANAGEMENT/SOCIAL WORK - NSDCPEFALRISK_GEN_ALL_CORE
For information on Fall & Injury Prevention, visit: [email or web address] OR  [email or web address] OR  https://www.cdc.gov/steadi/patient.html

## 2022-03-02 NOTE — DISCHARGE NOTE NURSING/CASE MANAGEMENT/SOCIAL WORK - PATIENT PORTAL LINK FT
You can access the FollowMyHealth Patient Portal offered by [facility] by registering at the following website: [email or web address]. By joining [facility]’s FollowMyHealth portal, you will also be able to view your health information using other applications (apps) compatible with our system.

## 2022-03-02 NOTE — DISCHARGE NOTE PROVIDER - NSDCCPTREATMENT_GEN_ALL_CORE_FT
PRINCIPAL PROCEDURE  Procedure: Wedge resection, lung, robot-assisted, using VATS  Findings and Treatment:

## 2022-03-02 NOTE — DIETITIAN INITIAL EVALUATION ADULT. - PERTINENT MEDS FT
MEDICATIONS  (STANDING):  acetaminophen     Tablet .. 975 milliGRAM(s) Oral every 6 hours  famotidine    Tablet 20 milliGRAM(s) Oral every 12 hours  heparin   Injectable 5000 Unit(s) SubCutaneous every 12 hours  lactated ringers. 1000 milliLiter(s) (30 mL/Hr) IV Continuous <Continuous>  lidocaine   4% Patch 1 Patch Transdermal every 24 hours  senna 2 Tablet(s) Oral at bedtime  tamsulosin 0.4 milliGRAM(s) Oral at bedtime

## 2022-03-02 NOTE — DISCHARGE NOTE PROVIDER - NSDCFUADDAPPT_GEN_ALL_CORE_FT
See Dr. Tirado's office in 2 weeks. Please call [phone] for an apt. Please get an CXR the day before your appointment and bring the CD with you to your follow up appointment.  Take pain pills only as needed. Please take a laxative to help support your bowel movements while on pain medication. Laxatives can be available over the counter at your local pharmacy.  Please call the office at [phone] if you have fever's chills, worsening shortness of breath, chest pain, warmth, redness or purulent discharge from the insertion site.

## 2022-03-02 NOTE — PROGRESS NOTE ADULT - SUBJECTIVE AND OBJECTIVE BOX
TAMMIE OWEN      71y   Female   MRN-[id number]         No Known Allergies             Daily     Daily Drug Dosing Weight  Height (cm): 162.6 (28 Feb 2022 14:12)  Weight (kg): 51.3 (28 Feb 2022 14:12)  BMI (kg/m2): 19.4 (28 Feb 2022 14:12)  BSA (m2): 1.54 (28 Feb 2022 14:12)    71 year old Mandarin speaking   female with pre op dx of solitary pulmonary nodule is scheduled for robotic assisted right video assisted thoracoscopy right upper lobectomy, mediastinal lymph node dissection . (17 Feb 2022 14:15)    Procedure: FB, robot-assisted RVATS, wedge resection of RUL mass, Frozen: necrotizing granuloma. 2/28/2022                       Issues:              Necrotizing granuloma  Lung nodule              Postop pain              Chest tube in place  HTN                Home Medications:  amLODIPine 2.5 mg oral tablet: 1 tab(s) orally once a day (28 Feb 2022 14:19)  vitamin D: 1 tab(s) orally once a day (28 Feb 2022 14:19)    PAST MEDICAL & SURGICAL HISTORY:  HTN (hypertension)    Solitary pulmonary nodule    H/O colonoscopy    History of appendectomy      Vital Signs Last 24 Hrs  T(C): 36.5 (02 Mar 2022 04:00), Max: 36.7 (01 Mar 2022 10:00)  T(F): 97.7 (02 Mar 2022 04:00), Max: 98 (01 Mar 2022 10:00)  HR: 56 (02 Mar 2022 07:00) (52 - 77)  BP: 107/62 (02 Mar 2022 07:00) (94/56 - 133/59)  BP(mean): 74 (02 Mar 2022 07:00) (60 - 94)  RR: 15 (02 Mar 2022 07:00) (13 - 28)  SpO2: 99% (02 Mar 2022 07:00) (95% - 100%)  I&O's Detail    01 Mar 2022 07:01  -  02 Mar 2022 07:00  --------------------------------------------------------  IN:    Lactated Ringers: 540 mL    Lactated Ringers Bolus: 250 mL    Oral Fluid: 1080 mL  Total IN: 1870 mL    OUT:    Chest Tube (mL): 20 mL    Voided (mL): 2200 mL  Total OUT: 2220 mL    Total NET: -350 mL      CAPILLARY BLOOD GLUCOSE      Home Medications:  amLODIPine 2.5 mg oral tablet: 1 tab(s) orally once a day (28 Feb 2022 14:19)  vitamin D: 1 tab(s) orally once a day (28 Feb 2022 14:19)    MEDICATIONS  (STANDING):  acetaminophen     Tablet .. 975 milliGRAM(s) Oral every 6 hours  famotidine    Tablet 20 milliGRAM(s) Oral every 12 hours  heparin   Injectable 5000 Unit(s) SubCutaneous every 12 hours  lactated ringers Bolus 250 milliLiter(s) IV Bolus once  lactated ringers. 1000 milliLiter(s) (30 mL/Hr) IV Continuous <Continuous>  lidocaine   4% Patch 1 Patch Transdermal every 24 hours  senna 2 Tablet(s) Oral at bedtime  tamsulosin 0.4 milliGRAM(s) Oral at bedtime    MEDICATIONS  (PRN):  naloxone Injectable 0.1 milliGRAM(s) IV Push every 3 minutes PRN For ANY of the following changes in patient status:  A. RR LESS THAN 10 breaths per minute, B. Oxygen saturation LESS THAN 90%, C. Sedation score of 6  ondansetron Injectable 4 milliGRAM(s) IV Push every 6 hours PRN Nausea  oxyCODONE    IR 5 milliGRAM(s) Oral every 3 hours PRN Moderate & Severe Pain (4- 10)  sodium chloride 3%  Inhalation 4 milliLiter(s) Inhalation every 8 hours PRN For sputum induction        Physical exam:   General:               Pt is awake, alert,  appears to be in pain but not in  distress                                                  Neuro:                  Nonfocal                             Cardiovascular:   S1 & S2, regular                           Respiratory:         Air entry is fair and equal on both sides, has bilateral conducted sounds                           GI:                          Soft, nondistended and nontender, Bowel sounds active                            Ext:                        No cyanosis or edema                               Labs:                                                                           12.7   8.33  )-----------( 145      ( 02 Mar 2022 04:43 )             38.5             03-02    138  |  105  |  13  ----------------------------<  118<H>  4.1   |  23  |  0.58    Ca    8.6      02 Mar 2022 04:43  Phos  3.3     03-01  Mg     2.00     03-01                      Culture - Acid Fast - Sputum w/Smear (collected 01 Mar 2022 09:22)  Source: .Sputum Sputum    Culture - Acid Fast - Tissue w/Smear (collected 28 Feb 2022 20:39)  Source: .Tissue right upper lobe wedge nodule    Culture - Fungal, Tissue (collected 28 Feb 2022 20:39)  Source: .Tissue right upper lobe wedge nodule  Preliminary Report (01 Mar 2022 07:29):    Testing in progress    Culture - Tissue with Gram Stain (collected 28 Feb 2022 20:39)  Source: .Tissue right upper lobe wedge nodule  Gram Stain (28 Feb 2022 23:10):    No polymorphonuclear cells seen per low power field    No organisms seen per oil power field  Preliminary Report (01 Mar 2022 17:32):    No growth        CXR:  < from: Xray Chest 1 View- PORTABLE-Routine (Xray Chest 1 View- PORTABLE-Routine in AM.) (03.01.22 @ 12:21) >  Right-sided chest tube is present. Chain sutures overlie the periphery of   the right upper lobe. Lungs show no focal consolidations. No definite   effusion or pneumothorax.      COMPARISON:  February 28      IMPRESSION:  Status post right thoracotomy with chest tube.      Plan:  General: 71yFemale s/p FB, robot-assisted RVATS, wedge resection of RUL mass, Frozen: necrotizing granuloma. 2/28/2022, experiencing  pain with deep breathing.                             Neuro:                                         Pain control with Oxycodone /  Tylenol PRN                            Cardiovascular:                                          HTN: Continue hemodynamic monitoring and restart Norvasc if BP allows                            Respiratory:                                         Pt is on 2L  nasal canula, wean off as tolerated                                          Comfortable, not in any distress.                                         Encourage incentive spirometry                                          Monitor chest tube output                                         Chest tube to water seal                                                                  Continue bronchodilators, pulmonary toilet     Necrotizing granuloma  Respiratory isolation  Sputum for AFB x 3 - Results pending                            GI                                         On  regular diet as tolerated                                         Continue Zofran / Reglan for nausea - PRN                                         Continue bowel regimen	                                                                 Renal:                                                                                  Monitor I/Os and electrolytes                                                                                        Hem/ Onc:                                         DVT prophylaxis with SQ Heparin and SCDs                                         Monitor chest tube output &  signs of bleeding.                                          Follow CBC in AM                           Infectious disease:                                            Monitor for fever / leukocytosis.                                          All surgical incision / chest tube  sites look clean                            Endocrine                                            Continue Accu-Checks with coverage       Pertinent clinical, laboratory, radiographic, hemodynamic, echocardiographic, respiratory data, microbiologic data and chart were reviewed and analyzed frequently throughout the course of the day and night  Patient seen, examined and plan discussed with CT Surgeon Dr. Tirado  / CTICU team during rounds.    OOB to chair and ambulate as tolerated.     Status discussed with patient /  patient's family and updated plan of care    I have spent 40 minutes with this patient including 20 minutes of time coordinating care, updating patient family.          Vishnu Wharton MD

## 2022-03-02 NOTE — DISCHARGE NOTE PROVIDER - NSDCMRMEDTOKEN_GEN_ALL_CORE_FT
amLODIPine 2.5 mg oral tablet: 1 tab(s) orally once a day  vitamin D: 1 tab(s) orally once a day   acetaminophen 325 mg oral tablet: 3 tab(s) orally every 8 hours, As Needed -for moderate pain   amLODIPine 2.5 mg oral tablet: 1 tab(s) orally once a day  Ches X Ray: CXR PA/Lat  Please give copy of imaging to patient and fax results to Dr. Tirado&#x27;s office at [phone], thank you   Colace 100 mg oral capsule: 1 cap(s) orally every 8 hours, As Needed -for constipation   oxyCODONE 5 mg oral tablet: 1 tab(s) orally every 6 to 8 hours, As Needed -Moderate &amp; Severe Pain (4- 10) - for severe pain MDD:4  vitamin D: 1 tab(s) orally once a day

## 2022-03-02 NOTE — DIETITIAN INITIAL EVALUATION ADULT. - OTHER INFO
72 y/o Mandarin speaking female admitted with dx pulmonary nodule s/p RVATS and wedge resection 2/28. Visited with pt to obtain nutrition hx utilizing Mandarin  ID: [id number]. Pt said she has a fair oral intake at present due to discomfort. She said she had been eating well at home and denies food allergies, nausea/vomiting/diarrhea/constipation, or issues with chewing/swallowing; last BM 2/28. No weight changes reported PTA. She declined offer for ONS due to dislike of shakes. She prefers cultural foods that her family sometimes brings her. Receiving IVF of LR and IVPB fluids. Encourage PO intake as tolerated. RDN services to remain available as needed.

## 2022-03-03 PROBLEM — I10 ESSENTIAL (PRIMARY) HYPERTENSION: Chronic | Status: ACTIVE | Noted: 2022-02-17

## 2022-03-03 PROBLEM — R91.1 SOLITARY PULMONARY NODULE: Chronic | Status: ACTIVE | Noted: 2022-02-17

## 2022-03-05 LAB
CULTURE RESULTS: SIGNIFICANT CHANGE UP
SPECIMEN SOURCE: SIGNIFICANT CHANGE UP

## 2022-03-09 LAB — SURGICAL PATHOLOGY STUDY: SIGNIFICANT CHANGE UP

## 2022-03-10 ENCOUNTER — NON-APPOINTMENT (OUTPATIENT)
Age: 72
End: 2022-03-10

## 2022-03-12 PROBLEM — M31.30 NECROTIZING GRANULOMATOUS INFLAMMATION OF LUNG: Status: ACTIVE | Noted: 2022-03-12

## 2022-03-16 ENCOUNTER — NON-APPOINTMENT (OUTPATIENT)
Age: 72
End: 2022-03-16

## 2022-03-17 ENCOUNTER — APPOINTMENT (OUTPATIENT)
Dept: THORACIC SURGERY | Facility: CLINIC | Age: 72
End: 2022-03-17
Payer: MEDICARE

## 2022-03-17 VITALS
DIASTOLIC BLOOD PRESSURE: 85 MMHG | TEMPERATURE: 97.6 F | SYSTOLIC BLOOD PRESSURE: 138 MMHG | HEART RATE: 84 BPM | RESPIRATION RATE: 18 BRPM | OXYGEN SATURATION: 96 % | BODY MASS INDEX: 19.57 KG/M2 | WEIGHT: 114 LBS

## 2022-03-17 DIAGNOSIS — M31.30 WEGENER'S GRANULOMATOSIS W/OUT RENAL INVOLVEMENT: ICD-10-CM

## 2022-03-17 PROCEDURE — 99024 POSTOP FOLLOW-UP VISIT: CPT

## 2022-03-23 ENCOUNTER — NON-APPOINTMENT (OUTPATIENT)
Age: 72
End: 2022-03-23

## 2022-03-30 LAB
CULTURE RESULTS: SIGNIFICANT CHANGE UP
SPECIMEN SOURCE: SIGNIFICANT CHANGE UP

## 2022-04-20 LAB
CULTURE RESULTS: SIGNIFICANT CHANGE UP
SPECIMEN SOURCE: SIGNIFICANT CHANGE UP

## 2022-04-21 LAB
CULTURE RESULTS: SIGNIFICANT CHANGE UP
SPECIMEN SOURCE: SIGNIFICANT CHANGE UP
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
## (undated) DEVICE — XI ARM FORCEP FENESTRATED BIPOLAR 8MM

## (undated) DEVICE — SOL IRR POUR NS 0.9% 500ML

## (undated) DEVICE — NDL HYPO REGULAR BEVEL 22G X 1.5" (TURQUOISE)

## (undated) DEVICE — TROCAR COVIDIEN VERSASTEP PLUS 15MM STANDARD

## (undated) DEVICE — ELCTR BOVIE TIP BLADE INSULATED 2.75" EDGE

## (undated) DEVICE — ENDOCATCH GENERAL 10MM (PURPLE)

## (undated) DEVICE — SUT VICRYL 4-0 27" PS-2 UNDYED

## (undated) DEVICE — XI OBTURATOR OPTICAL BLADELESS 8MM

## (undated) DEVICE — ENDOCATCH GENERAL 15MM (PURPLE)

## (undated) DEVICE — DRSG GAUZE PACKTNER ROLL

## (undated) DEVICE — ELCTR BOVIE TIP BLADE INSULATED 6.5" EDGE

## (undated) DEVICE — SUT VICRYL 0 27" UR-6

## (undated) DEVICE — XI DRAPE COLUMN

## (undated) DEVICE — BLADE SURGICAL #10 STAINLESS

## (undated) DEVICE — STERIS DEFENDO 3-PIECE KIT (AIR/WATER, SUCTION & BIOPSY VALVES)

## (undated) DEVICE — SUT SILK 2-0 30" SH

## (undated) DEVICE — VENODYNE/SCD SLEEVE CALF MEDIUM

## (undated) DEVICE — TUBING SUCTION 20FT

## (undated) DEVICE — SYR LUER LOK 20CC

## (undated) DEVICE — DRSG TEGADERM 4X4.75"

## (undated) DEVICE — CHEST DRAIN OASIS DRY SUCTION WATER SEAL

## (undated) DEVICE — XI DRAPE ARM

## (undated) DEVICE — GOWN XL

## (undated) DEVICE — DRSG MASTISOL

## (undated) DEVICE — GRASPER LAPA 5MMX35CM

## (undated) DEVICE — TUBING STRYKEFLOW II SUCTION / IRRIGATOR

## (undated) DEVICE — XI ARM CLIP APPLIER MEDIUM-LARGE

## (undated) DEVICE — XI ARM GRASPER TIP UP FENESTRATED

## (undated) DEVICE — XI SEAL UNIV 5- 8 MM

## (undated) DEVICE — TROCAR COVIDIEN VERSAONE BLADELESS FIXATION 12MM STANDARD

## (undated) DEVICE — DRSG CURITY GAUZE SPONGE 4 X 4" 12-PLY

## (undated) DEVICE — SUT PROLENE 0 30" CT-1

## (undated) DEVICE — PACK ROBOTIC LIJ

## (undated) DEVICE — TUBING OLYMPUS INSUFFLATION

## (undated) DEVICE — MARKING PEN W RULER

## (undated) DEVICE — XI ARM GRASPER BIPOLAR LONG 8MM

## (undated) DEVICE — D HELP - CLEARVIEW CLEARIFY SYSTEM